# Patient Record
Sex: FEMALE | Race: WHITE | NOT HISPANIC OR LATINO | Employment: OTHER | URBAN - METROPOLITAN AREA
[De-identification: names, ages, dates, MRNs, and addresses within clinical notes are randomized per-mention and may not be internally consistent; named-entity substitution may affect disease eponyms.]

---

## 2017-01-13 ENCOUNTER — PATIENT OUTREACH (OUTPATIENT)
Dept: ADMINISTRATIVE | Facility: HOSPITAL | Age: 82
End: 2017-01-13

## 2017-01-13 RX ORDER — TRAVOPROST 0.04 MG/ML
SOLUTION/ DROPS OPHTHALMIC
Qty: 2.5 ML | Refills: 5 | Status: SHIPPED | OUTPATIENT
Start: 2017-01-13

## 2017-03-29 ENCOUNTER — DOCUMENTATION ONLY (OUTPATIENT)
Dept: FAMILY MEDICINE | Facility: CLINIC | Age: 82
End: 2017-03-29

## 2017-04-12 ENCOUNTER — TELEPHONE (OUTPATIENT)
Dept: FAMILY MEDICINE | Facility: CLINIC | Age: 82
End: 2017-04-12

## 2017-04-12 DIAGNOSIS — F03.C0 SEVERE DEMENTIA: Primary | ICD-10-CM

## 2017-04-12 NOTE — TELEPHONE ENCOUNTER
Patient needs to be placed in a nursing home cant walk any more , severe dementia and cant see.   The   Said he cant take care of her any more.  And we need a referral for HH /pend

## 2017-04-12 NOTE — TELEPHONE ENCOUNTER
----- Message from Leigha Galloway sent at 4/12/2017  2:05 PM CDT -----  [facility] with [facility] is requesting a call back concerning long term nursing home placement for patient, contact Lin at [phone].    Thank you

## 2017-04-13 ENCOUNTER — OUTPATIENT CASE MANAGEMENT (OUTPATIENT)
Dept: ADMINISTRATIVE | Facility: OTHER | Age: 82
End: 2017-04-13

## 2017-04-13 NOTE — PROGRESS NOTES
For your Information:    Please note the following patient has been assigned to ARTURO Genao with Outpatient Complex Care Mgmt for screening.    Reason: Low Risk  Severe dementia    Please contact [facility] at ext [phone] with questions.    Thank you,  Sheyla Hernandez SSC

## 2017-04-13 NOTE — PROGRESS NOTES
Attempt #:  1  This CSW attempted to reach patient/caregiver to provide resource and was unable to leave a message requesting a return call.

## 2017-04-17 ENCOUNTER — OUTPATIENT CASE MANAGEMENT (OUTPATIENT)
Dept: ADMINISTRATIVE | Facility: OTHER | Age: 82
End: 2017-04-17

## 2017-04-17 NOTE — LETTER
April 17, 2017    Kaleigh Hall  [facility] [address] [address] [address]             [facility] [address] [place] [address] Dear Mrs. Hall:    I am writing from the Outpatient Complex Care Management Department at [facility].  I received a referral from Dr. Hazel to contact you or your caregiver regarding any needs you may have. I have attempted to contact you or your cargeiver by phone two times unsuccessfully.  Please contact the Outpatient Complex Care Management Department at [phone] if you would like to discuss your needs.      Sincerely,         ARTURO Genao

## 2017-04-17 NOTE — PROGRESS NOTES
Attempt #:  2  This CSW attempted to reach patient/caregiver to provide resource and was unable to leave msg requesting a return call.  Letter with contact information was sent via CSW to patient/caregiver.  Referral source notified.

## 2017-05-02 ENCOUNTER — TELEPHONE (OUTPATIENT)
Dept: AUDIOLOGY | Facility: CLINIC | Age: 82
End: 2017-05-02

## 2017-05-02 NOTE — TELEPHONE ENCOUNTER
Informed pt's  that the pt's hearing aid has returned from repair and is ready to be picked up at the second floor lobby desk.

## 2018-03-20 ENCOUNTER — TELEPHONE (OUTPATIENT)
Dept: AUDIOLOGY | Facility: CLINIC | Age: 83
End: 2018-03-20

## 2018-03-20 NOTE — TELEPHONE ENCOUNTER
Informed pt's  that the patient's hearing aid warranty has  and is eligible for renewal for a cost of $150.00/hearing aid for an extra year of coverage. Pt will call back if he wants to renew the warranty before the end of the month.  (No call back as of 3/28/18.)

## 2018-09-25 ENCOUNTER — CLINICAL SUPPORT (OUTPATIENT)
Dept: AUDIOLOGY | Facility: CLINIC | Age: 83
End: 2018-09-25
Payer: MEDICARE

## 2018-09-25 DIAGNOSIS — Z97.4 WEARS HEARING AID IN BOTH EARS: Primary | ICD-10-CM

## 2018-09-25 PROCEDURE — V5014 HEARING AID REPAIR/MODIFYING: HCPCS | Mod: CSM,,, | Performed by: AUDIOLOGIST-HEARING AID FITTER

## 2018-09-25 PROCEDURE — COVTAX COVINGTON PRESCRIBED 4.25%: Mod: CSM,,, | Performed by: AUDIOLOGIST-HEARING AID FITTER

## 2019-03-01 ENCOUNTER — TELEPHONE (OUTPATIENT)
Dept: AUDIOLOGY | Facility: CLINIC | Age: 84
End: 2019-03-01

## 2019-03-01 NOTE — TELEPHONE ENCOUNTER
Spoke with Mr GARCIA who told me that his wife's left hearing aid is broken and the right aid is lost. Explained that the left is in warranty but the right aid would be regular price ($2500 plus tax) to replace. Also explained that she would need an updated hearing test for a new order and a new impressions since he said the right aid never fit well. He said she is wheelchair bound and in a nursing home in [place]. Told him to have the nursing ome coordinate transportation to the clinic and call the audiology dept to schedule the appt.

## 2019-03-08 DIAGNOSIS — H91.90 HEARING DIFFICULTY, UNSPECIFIED LATERALITY: Primary | ICD-10-CM

## 2019-03-19 ENCOUNTER — CLINICAL SUPPORT (OUTPATIENT)
Dept: AUDIOLOGY | Facility: CLINIC | Age: 84
End: 2019-03-19
Payer: MEDICARE

## 2019-03-19 ENCOUNTER — OFFICE VISIT (OUTPATIENT)
Dept: OTOLARYNGOLOGY | Facility: CLINIC | Age: 84
End: 2019-03-19
Payer: MEDICARE

## 2019-03-19 VITALS — BODY MASS INDEX: 30.46 KG/M2 | WEIGHT: 171.94 LBS | HEIGHT: 63 IN

## 2019-03-19 DIAGNOSIS — Z97.4 WEARS HEARING AID IN BOTH EARS: Primary | ICD-10-CM

## 2019-03-19 DIAGNOSIS — H90.3 SENSORINEURAL HEARING LOSS (SNHL) OF BOTH EARS: Primary | ICD-10-CM

## 2019-03-19 DIAGNOSIS — Z97.4 WEARS HEARING AID IN BOTH EARS: ICD-10-CM

## 2019-03-19 DIAGNOSIS — H61.23 BILATERAL IMPACTED CERUMEN: ICD-10-CM

## 2019-03-19 DIAGNOSIS — H90.3 BILATERAL SENSORINEURAL HEARING LOSS: Primary | ICD-10-CM

## 2019-03-19 PROCEDURE — 92557 PR COMPREHENSIVE HEARING TEST: ICD-10-PCS | Mod: HCNC,S$GLB,, | Performed by: AUDIOLOGIST

## 2019-03-19 PROCEDURE — G0268 PR REMOVAL OF IMPACTED WAX MD: ICD-10-PCS | Mod: HCNC,S$GLB,, | Performed by: NURSE PRACTITIONER

## 2019-03-19 PROCEDURE — 99203 PR OFFICE/OUTPT VISIT, NEW, LEVL III, 30-44 MIN: ICD-10-PCS | Mod: 25,HCNC,S$GLB, | Performed by: NURSE PRACTITIONER

## 2019-03-19 PROCEDURE — 99203 OFFICE O/P NEW LOW 30 MIN: CPT | Mod: 25,HCNC,S$GLB, | Performed by: NURSE PRACTITIONER

## 2019-03-19 PROCEDURE — 1101F PR PT FALLS ASSESS DOC 0-1 FALLS W/OUT INJ PAST YR: ICD-10-PCS | Mod: HCNC,CPTII,S$GLB, | Performed by: NURSE PRACTITIONER

## 2019-03-19 PROCEDURE — 92557 COMPREHENSIVE HEARING TEST: CPT | Mod: HCNC,S$GLB,, | Performed by: AUDIOLOGIST

## 2019-03-19 PROCEDURE — 99999 PR PBB SHADOW E&M-EST. PATIENT-LVL III: CPT | Mod: PBBFAC,HCNC,, | Performed by: NURSE PRACTITIONER

## 2019-03-19 PROCEDURE — 1101F PT FALLS ASSESS-DOCD LE1/YR: CPT | Mod: HCNC,CPTII,S$GLB, | Performed by: NURSE PRACTITIONER

## 2019-03-19 PROCEDURE — G0268 REMOVAL OF IMPACTED WAX MD: HCPCS | Mod: HCNC,S$GLB,, | Performed by: NURSE PRACTITIONER

## 2019-03-19 PROCEDURE — 99999 PR PBB SHADOW E&M-EST. PATIENT-LVL III: ICD-10-PCS | Mod: PBBFAC,HCNC,, | Performed by: NURSE PRACTITIONER

## 2019-03-19 PROCEDURE — 99999 PR PBB SHADOW E&M-EST. PATIENT-LVL I: ICD-10-PCS | Mod: PBBFAC,HCNC,,

## 2019-03-19 PROCEDURE — 99999 PR PBB SHADOW E&M-EST. PATIENT-LVL I: CPT | Mod: PBBFAC,HCNC,,

## 2019-03-19 RX ORDER — CLOPIDOGREL BISULFATE 75 MG/1
TABLET ORAL
COMMUNITY
Start: 2019-02-19

## 2019-03-19 RX ORDER — LUBIPROSTONE 8 UG/1
8 CAPSULE ORAL 2 TIMES DAILY WITH MEALS
COMMUNITY

## 2019-03-19 NOTE — PROGRESS NOTES
Kaleigh Hall came in on 03/19/2019 for a hearing aid follow up. Pt recently had a left aid recased but totally lost the right aid. In order to purchase a new one, she needs a new hearing test which was done today. Her  reported that the right aid never fit her well so new impressions will be taken to order a new aid for the right ear. Reviewed insertion and removal, daily care and maintenance, and battery and wax filter change procedure with patient. Pt's  will be called when new hearing aid returns to the clinic and has been programmed with previous user settings. Pt requires transportation from the nursing home in [place] by the home's van. He requested to try not to have to bring her back for programming but told him that she can try the aid and call to set up an appt if necessary.

## 2019-03-19 NOTE — PROGRESS NOTES
Subjective:       Patient ID: Kaleigh Hall is a 89 y.o. female.    Chief Complaint: Cerumen Impaction and Hearing Loss    Hearing Loss: No ear pain, no fever and no rhinorrhea.     Patient resides in nursing home, but is accompanied by her . Her  states she has memory problems. Patient wears hearing aids and returns today for audiogram and possibly new hearing aids.  She denies otalgia or otorrhea.  She denies other ENT symptoms or concerns at this time.     Review of Systems   Constitutional: Negative.  Negative for fatigue and fever.   HENT: Positive for hearing loss. Negative for ear pain, rhinorrhea and sore throat.    Eyes: Negative.    Respiratory: Negative.  Negative for cough, shortness of breath and wheezing.    Cardiovascular: Negative.    Gastrointestinal: Negative.    Musculoskeletal: Negative.  Negative for gait problem.   Skin: Negative.  Negative for color change, pallor and rash.   Neurological: Negative.    Hematological: Negative.  Negative for adenopathy.   Psychiatric/Behavioral: Negative.  Negative for agitation.       Objective:      Physical Exam   Constitutional: Vital signs are normal. She appears well-developed and well-nourished. She is cooperative. She does not appear ill. No distress.   HENT:   Head: Normocephalic and atraumatic.   Right Ear: Tympanic membrane, external ear and ear canal normal. Tympanic membrane is not erythematous. No middle ear effusion. Decreased hearing is noted.   Left Ear: Tympanic membrane, external ear and ear canal normal. Tympanic membrane is not erythematous.  No middle ear effusion. Decreased hearing is noted.   Nose: Nose normal. No mucosal edema or rhinorrhea.   Mouth/Throat: Uvula is midline, oropharynx is clear and moist and mucous membranes are normal.     SEPARATE PROCEDURE IN OFFICE:   Procedure: Removal of impacted cerumen, bilateral   Pre Procedure Diagnosis: Cerumen Impaction   Post Procedure Diagnosis: Cerumen Impaction   Verbal  informed consent in regards to risk of trauma to ear canal, ear drum or hearing, discomfort during procedure and/or inability to remove cerumen impaction in one session or unforeseen events or complications.   No anesthesia.     Procedure in detail:   Ear canal visualized bilateral with appropriate size ear speculum utilizing Operating Head Binocular Otomicroscope   Utilizing the following: Ring curet and alligator forceps were used AU. The impacted cerumen of the ear canals was removed atraumatically. The TM and EAC were then inspected and found to be clear of wax. See description of TMs/EACs in PE above.   Complications: No   Condition: Improved/Good     Eyes: EOM and lids are normal. Right eye exhibits no discharge. Left eye exhibits no discharge. No scleral icterus.   Neck: Trachea normal and normal range of motion. Neck supple. No tracheal deviation present.   Cardiovascular: Normal rate.   Pulmonary/Chest: Effort normal. No stridor. No respiratory distress. She has no wheezes.   Musculoskeletal: Normal range of motion.   Did not attempt to transfer patient from wheelchair for duration of visit   Neurological: She is alert.   Did not attempt to transfer patient from wheelchair for duration of office visit today   Skin: Skin is warm, dry and intact. No lesion and no rash noted. She is not diaphoretic. No cyanosis. No pallor.   Psychiatric: She has a normal mood and affect. Her speech is normal and behavior is normal. Judgment and thought content normal.   Nursing note and vitals reviewed.      Assessment:     Cerumen impactions removed AU    Moderate to moderately-severe sensorineural hearing loss AU  Plan:     Recommend continued daily use of binaural amplification and annual monitoring of hearing    Return to clinic as needed for further ENT concerns.

## 2019-04-01 ENCOUNTER — CLINICAL SUPPORT (OUTPATIENT)
Dept: AUDIOLOGY | Facility: CLINIC | Age: 84
End: 2019-04-01
Payer: MEDICARE

## 2019-04-01 PROCEDURE — COVTAX COVINGTON PRESCRIBED 4.25%: Mod: CSM,,, | Performed by: AUDIOLOGIST-HEARING AID FITTER

## 2019-04-01 PROCEDURE — V5050: ICD-10-PCS | Mod: CSM,,, | Performed by: AUDIOLOGIST-HEARING AID FITTER

## 2019-04-01 PROCEDURE — V5050 HEARING AID MONAURAL IN EAR: HCPCS | Mod: CSM,,, | Performed by: AUDIOLOGIST-HEARING AID FITTER

## 2019-04-01 PROCEDURE — COVTAX COVINGTON PRESCRIBED 4.25%: ICD-10-PCS | Mod: CSM,,, | Performed by: AUDIOLOGIST-HEARING AID FITTER

## 2019-10-05 ENCOUNTER — TELEPHONE (OUTPATIENT)
Dept: FAMILY MEDICINE | Facility: CLINIC | Age: 84
End: 2019-10-05

## 2020-01-07 ENCOUNTER — PES CALL (OUTPATIENT)
Dept: ADMINISTRATIVE | Facility: CLINIC | Age: 85
End: 2020-01-07

## 2020-02-27 ENCOUNTER — TELEPHONE (OUTPATIENT)
Dept: AUDIOLOGY | Facility: CLINIC | Age: 85
End: 2020-02-27

## 2020-02-27 NOTE — TELEPHONE ENCOUNTER
Contacted  Vishal to let him know the patient's hearing aid is back from repair and can be picked up from the second floor check in desk.

## 2020-05-20 ENCOUNTER — TELEPHONE (OUTPATIENT)
Dept: AUDIOLOGY | Facility: CLINIC | Age: 85
End: 2020-05-20

## 2020-05-20 NOTE — TELEPHONE ENCOUNTER
Mr. Rodgers dropped off the patient's hearing aid. The hearing aid shell is broken and we need to send the hearing aid for repair. I attempted to contact Mr. Rodgers and he does not have a voicemail set up. I am sending the hearing aid off for repair today 05/20/2020.

## 2020-05-28 ENCOUNTER — TELEPHONE (OUTPATIENT)
Dept: AUDIOLOGY | Facility: CLINIC | Age: 85
End: 2020-05-28

## 2020-05-28 NOTE — TELEPHONE ENCOUNTER
Attempted to contact Mr. Rodgers to let him know the patient's hearing aid is back from repair. There is no voicemail setup. I will try again later.

## 2020-06-08 ENCOUNTER — TELEPHONE (OUTPATIENT)
Dept: AUDIOLOGY | Facility: CLINIC | Age: 85
End: 2020-06-08

## 2020-06-08 NOTE — TELEPHONE ENCOUNTER
Called to inform Mr Hall that his wife's hearing aid has returned from repair but there was no answer and no VM. Will continue to call later.

## 2020-09-16 ENCOUNTER — TELEPHONE (OUTPATIENT)
Dept: AUDIOLOGY | Facility: CLINIC | Age: 85
End: 2020-09-16

## 2020-09-16 NOTE — TELEPHONE ENCOUNTER
09/18/2020 - Spoke to Mr. Rodgers and he plans to  the hearing aid from the second floor check in desk on Monday 09/21/2020.    09/16 & 09/17 - Attempted to contact  to let him know the patient's hearing aid is back from repair and can be picked up from the second floor check in desk. There is no voicemail setup. I will try again later.

## 2021-01-06 ENCOUNTER — TELEPHONE (OUTPATIENT)
Dept: AUDIOLOGY | Facility: CLINIC | Age: 86
End: 2021-01-06

## 2021-01-19 ENCOUNTER — TELEPHONE (OUTPATIENT)
Dept: AUDIOLOGY | Facility: CLINIC | Age: 86
End: 2021-01-19

## 2021-01-20 ENCOUNTER — CLINICAL SUPPORT (OUTPATIENT)
Dept: AUDIOLOGY | Facility: CLINIC | Age: 86
End: 2021-01-20

## 2021-01-20 PROCEDURE — V5014 PR HEARING AID REPAIR/MODIFYING: ICD-10-PCS | Mod: CSM,,, | Performed by: AUDIOLOGIST-HEARING AID FITTER

## 2021-01-20 PROCEDURE — V5014 HEARING AID REPAIR/MODIFYING: HCPCS | Mod: CSM,,, | Performed by: AUDIOLOGIST-HEARING AID FITTER

## 2021-01-20 PROCEDURE — COVTAX COVINGTON PRESCRIBED 4.25%: ICD-10-PCS | Mod: CSM,,, | Performed by: AUDIOLOGIST-HEARING AID FITTER

## 2021-01-20 PROCEDURE — COVTAX COVINGTON PRESCRIBED 4.25%: Mod: CSM,,, | Performed by: AUDIOLOGIST-HEARING AID FITTER

## 2021-04-29 ENCOUNTER — PATIENT MESSAGE (OUTPATIENT)
Dept: RESEARCH | Facility: HOSPITAL | Age: 86
End: 2021-04-29